# Patient Record
Sex: MALE | Race: BLACK OR AFRICAN AMERICAN | Employment: FULL TIME | URBAN - METROPOLITAN AREA
[De-identification: names, ages, dates, MRNs, and addresses within clinical notes are randomized per-mention and may not be internally consistent; named-entity substitution may affect disease eponyms.]

---

## 2017-11-06 ENCOUNTER — HOSPITAL ENCOUNTER (EMERGENCY)
Age: 27
Discharge: HOME | End: 2017-11-06
Attending: EMERGENCY MEDICINE | Admitting: EMERGENCY MEDICINE
Payer: SELF-PAY

## 2017-11-06 VITALS
OXYGEN SATURATION: 100 % | BODY MASS INDEX: 27.47 KG/M2 | SYSTOLIC BLOOD PRESSURE: 139 MMHG | DIASTOLIC BLOOD PRESSURE: 80 MMHG | HEIGHT: 67 IN | TEMPERATURE: 98.2 F | HEART RATE: 83 BPM | WEIGHT: 175.04 LBS | RESPIRATION RATE: 12 BRPM

## 2017-11-06 DIAGNOSIS — D57.1 HB-SS DISEASE WITHOUT CRISIS (HCC): Primary | ICD-10-CM

## 2017-11-06 LAB
ALBUMIN SERPL-MCNC: 4.1 G/DL (ref 3.5–5)
ALBUMIN/GLOB SERPL: 1 {RATIO} (ref 1.1–2.2)
ALP SERPL-CCNC: 81 U/L (ref 45–117)
ALT SERPL-CCNC: 37 U/L (ref 12–78)
ANION GAP SERPL CALC-SCNC: 4 MMOL/L (ref 5–15)
AST SERPL-CCNC: 25 U/L (ref 15–37)
BASOPHILS # BLD: 0 K/UL (ref 0–0.1)
BASOPHILS NFR BLD: 0 % (ref 0–1)
BILIRUB SERPL-MCNC: 0.7 MG/DL (ref 0.2–1)
BUN SERPL-MCNC: 10 MG/DL (ref 6–20)
BUN/CREAT SERPL: 10 (ref 12–20)
CALCIUM SERPL-MCNC: 8.8 MG/DL (ref 8.5–10.1)
CHLORIDE SERPL-SCNC: 104 MMOL/L (ref 97–108)
CO2 SERPL-SCNC: 31 MMOL/L (ref 21–32)
CREAT SERPL-MCNC: 0.97 MG/DL (ref 0.7–1.3)
EOSINOPHIL # BLD: 0 K/UL (ref 0–0.4)
EOSINOPHIL NFR BLD: 0 % (ref 0–7)
ERYTHROCYTE [DISTWIDTH] IN BLOOD BY AUTOMATED COUNT: 13 % (ref 11.5–14.5)
GLOBULIN SER CALC-MCNC: 4.3 G/DL (ref 2–4)
GLUCOSE SERPL-MCNC: 87 MG/DL (ref 65–100)
HCT VFR BLD AUTO: 43.5 % (ref 36.6–50.3)
HGB BLD-MCNC: 14.7 G/DL (ref 12.1–17)
LYMPHOCYTES # BLD: 1.6 K/UL (ref 0.8–3.5)
LYMPHOCYTES NFR BLD: 50 % (ref 12–49)
MCH RBC QN AUTO: 30.5 PG (ref 26–34)
MCHC RBC AUTO-ENTMCNC: 33.8 G/DL (ref 30–36.5)
MCV RBC AUTO: 90.2 FL (ref 80–99)
MONOCYTES # BLD: 0.4 K/UL (ref 0–1)
MONOCYTES NFR BLD: 13 % (ref 5–13)
NEUTS SEG # BLD: 1.2 K/UL (ref 1.8–8)
NEUTS SEG NFR BLD: 37 % (ref 32–75)
PLATELET # BLD AUTO: 245 K/UL (ref 150–400)
POTASSIUM SERPL-SCNC: 3.6 MMOL/L (ref 3.5–5.1)
PROT SERPL-MCNC: 8.4 G/DL (ref 6.4–8.2)
RBC # BLD AUTO: 4.82 M/UL (ref 4.1–5.7)
RETICS/RBC NFR AUTO: 0.9 % (ref 0.7–2.1)
SODIUM SERPL-SCNC: 139 MMOL/L (ref 136–145)
WBC # BLD AUTO: 3.3 K/UL (ref 4.1–11.1)

## 2017-11-06 PROCEDURE — 96374 THER/PROPH/DIAG INJ IV PUSH: CPT

## 2017-11-06 PROCEDURE — 96375 TX/PRO/DX INJ NEW DRUG ADDON: CPT

## 2017-11-06 PROCEDURE — 80053 COMPREHEN METABOLIC PANEL: CPT | Performed by: EMERGENCY MEDICINE

## 2017-11-06 PROCEDURE — 85025 COMPLETE CBC W/AUTO DIFF WBC: CPT | Performed by: EMERGENCY MEDICINE

## 2017-11-06 PROCEDURE — 36415 COLL VENOUS BLD VENIPUNCTURE: CPT | Performed by: EMERGENCY MEDICINE

## 2017-11-06 PROCEDURE — 99283 EMERGENCY DEPT VISIT LOW MDM: CPT

## 2017-11-06 PROCEDURE — 85045 AUTOMATED RETICULOCYTE COUNT: CPT | Performed by: EMERGENCY MEDICINE

## 2017-11-06 PROCEDURE — 74011250636 HC RX REV CODE- 250/636: Performed by: EMERGENCY MEDICINE

## 2017-11-06 PROCEDURE — 96361 HYDRATE IV INFUSION ADD-ON: CPT

## 2017-11-06 RX ORDER — OXYCODONE AND ACETAMINOPHEN 5; 325 MG/1; MG/1
1 TABLET ORAL
Qty: 20 TAB | Refills: 0 | Status: SHIPPED | OUTPATIENT
Start: 2017-11-06 | End: 2020-12-15

## 2017-11-06 RX ORDER — KETOROLAC TROMETHAMINE 30 MG/ML
30 INJECTION, SOLUTION INTRAMUSCULAR; INTRAVENOUS
Status: COMPLETED | OUTPATIENT
Start: 2017-11-06 | End: 2017-11-06

## 2017-11-06 RX ORDER — MORPHINE SULFATE 4 MG/ML
4 INJECTION INTRAVENOUS
Status: COMPLETED | OUTPATIENT
Start: 2017-11-06 | End: 2017-11-06

## 2017-11-06 RX ADMIN — KETOROLAC TROMETHAMINE 30 MG: 30 INJECTION, SOLUTION INTRAMUSCULAR at 14:11

## 2017-11-06 RX ADMIN — SODIUM CHLORIDE 1000 ML: 900 INJECTION, SOLUTION INTRAVENOUS at 13:54

## 2017-11-06 RX ADMIN — MORPHINE SULFATE 4 MG: 4 INJECTION INTRAVENOUS at 14:11

## 2017-11-06 NOTE — ED NOTES
Patient reports to ED with complaints of bone pain that began last night and that is worse in his lower extremities than it is in upper. Patient states he has a PMH of sickle cell and this is usually how his Sickle Cell Crisis begins. Patient took tylenol for the pain and obtained no relief. Patient placed on the monitor x2, call bell within reach. Side rails x2. Patient resting comfortably in stretcher. He reports having a ride home if he is medicated.

## 2017-11-06 NOTE — ED PROVIDER NOTES
[facility]  EMERGENCY DEPARTMENT HISTORY AND PHYSICAL EXAM       Date of Service: 11/6/2017   Patient Name: Ana María Humphrey   YOB: 1990  Medical Record Number: [id number]    History of Presenting Illness     Chief Complaint   Patient presents with    Sickle Cell Disease     Pt. complains of \"bone pain\"  since last night with hx. of same and dx with sickle cell        History Provided By:  patient    Additional History:   Ana María Humphrey is a 33 y.o. male  who presents ambulatory to the ED with cc of generalized bone pain greater in the lower extremities compare to the lower since last night. Pt notes pain is worse in the evenings and with change of temperature. He states symptoms are similar to past sickle cell symptoms. Pt notes taking tylenol without significant relief. He states his last sickle cell crisis was years ago. He specifically denies any fevers, chills, nausea, vomiting, chest pain, shortness of breath, headache, rash, diarrhea, sweating or weight loss. Social Hx: - Tobacco, - EtOH, - Illicit Drugs    There are no other complaints, changes or physical findings at this time. Primary Care Provider: None     Past History     Past Medical History:   Past Medical History:   Diagnosis Date    Sickle cell disease ([facility]         Past Surgical History:   History reviewed. No pertinent surgical history. Family History:   History reviewed. No pertinent family history. Social History:   Social History   Substance Use Topics    Smoking status: Never Smoker    Smokeless tobacco: Never Used    Alcohol use No        Allergies:   No Known Allergies      Review of Systems   Review of Systems   Constitutional: Negative. Negative for activity change, appetite change, chills, fatigue, fever and unexpected weight change. HENT: Negative. Negative for congestion, hearing loss, rhinorrhea, sneezing and voice change. Eyes: Negative.   Negative for pain and visual disturbance. Respiratory: Negative. Negative for apnea, cough, choking, chest tightness and shortness of breath. Cardiovascular: Negative. Negative for chest pain and palpitations. Gastrointestinal: Negative. Negative for abdominal distention, abdominal pain, blood in stool, diarrhea, nausea and vomiting. Genitourinary: Negative. Negative for difficulty urinating, flank pain, frequency and urgency. No discharge   Musculoskeletal: Negative for back pain and neck stiffness. Positive for bone pain (lower extremities > upper)   Skin: Negative. Negative for color change and rash. Neurological: Negative. Negative for dizziness, seizures, syncope, speech difficulty, weakness, numbness and headaches. Hematological: Negative for adenopathy. Psychiatric/Behavioral: Negative. Negative for agitation, behavioral problems, dysphoric mood and suicidal ideas. The patient is not nervous/anxious. Physical Exam  Physical Exam   Constitutional: He is oriented to person, place, and time. He appears well-developed and well-nourished. No distress. HENT:   Head: Normocephalic and atraumatic. Mouth/Throat: Oropharynx is clear and moist. No oropharyngeal exudate. Eyes: Conjunctivae and EOM are normal. Pupils are equal, round, and reactive to light. Right eye exhibits no discharge. Left eye exhibits no discharge. Neck: Normal range of motion. Neck supple. Cardiovascular: Normal rate, regular rhythm and intact distal pulses. Exam reveals no gallop and no friction rub. No murmur heard. Pulmonary/Chest: Effort normal and breath sounds normal. No respiratory distress. He has no wheezes. He has no rales. He exhibits no tenderness. Abdominal: Soft. Bowel sounds are normal. He exhibits no distension and no mass. There is no tenderness. There is no rebound and no guarding. Musculoskeletal: Normal range of motion. He exhibits no edema.    Lymphadenopathy:     He has no cervical adenopathy. Neurological: He is alert and oriented to person, place, and time. No cranial nerve deficit. Coordination normal.   Skin: Skin is warm and dry. No rash noted. No erythema. Psychiatric: He has a normal mood and affect. Nursing note and vitals reviewed. Medical Decision Making   I am the first provider for this patient. I reviewed the vital signs, available nursing notes, past medical history, past surgical history, family history and social history. Old Medical Records: Old medical records. Provider Notes:   DDx: sickle cell crisis, dehydration, electrolyte abnormality       ED Course:  1:51 PM   Initial assessment performed. The patients presenting problems have been discussed, and they are in agreement with the care plan formulated and outlined with them. I have encouraged them to ask questions as they arise throughout their visit. Diagnostic Study Results     Labs -      Recent Results (from the past 12 hour(s))   CBC WITH AUTOMATED DIFF    Collection Time: 11/06/17  1:56 PM   Result Value Ref Range    WBC 3.3 (L) 4.1 - 11.1 K/uL    RBC 4.82 4.10 - 5.70 M/uL    HGB 14.7 12.1 - 17.0 g/dL    HCT 43.5 36.6 - 50.3 %    MCV 90.2 80.0 - 99.0 FL    MCH 30.5 26.0 - 34.0 PG    MCHC 33.8 30.0 - 36.5 g/dL    RDW 13.0 11.5 - 14.5 %    PLATELET 904 941 - 617 K/uL    NEUTROPHILS 37 32 - 75 %    LYMPHOCYTES 50 (H) 12 - 49 %    MONOCYTES 13 5 - 13 %    EOSINOPHILS 0 0 - 7 %    BASOPHILS 0 0 - 1 %    ABS. NEUTROPHILS 1.2 (L) 1.8 - 8.0 K/UL    ABS. LYMPHOCYTES 1.6 0.8 - 3.5 K/UL    ABS. MONOCYTES 0.4 0.0 - 1.0 K/UL    ABS. EOSINOPHILS 0.0 0.0 - 0.4 K/UL    ABS.  BASOPHILS 0.0 0.0 - 0.1 K/UL   METABOLIC PANEL, COMPREHENSIVE    Collection Time: 11/06/17  1:56 PM   Result Value Ref Range    Sodium 139 136 - 145 mmol/L    Potassium 3.6 3.5 - 5.1 mmol/L    Chloride 104 97 - 108 mmol/L    CO2 31 21 - 32 mmol/L    Anion gap 4 (L) 5 - 15 mmol/L    Glucose 87 65 - 100 mg/dL    BUN 10 6 - 20 MG/DL Creatinine 0.97 0.70 - 1.30 MG/DL    BUN/Creatinine ratio 10 (L) 12 - 20      GFR est AA >60 >60 ml/min/1.73m2    GFR est non-AA >60 >60 ml/min/1.73m2    Calcium 8.8 8.5 - 10.1 MG/DL    Bilirubin, total 0.7 0.2 - 1.0 MG/DL    ALT (SGPT) 37 12 - 78 U/L    AST (SGOT) 25 15 - 37 U/L    Alk. phosphatase 81 45 - 117 U/L    Protein, total 8.4 (H) 6.4 - 8.2 g/dL    Albumin 4.1 3.5 - 5.0 g/dL    Globulin 4.3 (H) 2.0 - 4.0 g/dL    A-G Ratio 1.0 (L) 1.1 - 2.2     RETICULOCYTE COUNT    Collection Time: 11/06/17  1:56 PM   Result Value Ref Range    Reticulocyte count 0.9 0.7 - 2.1 %       Vital Signs-Reviewed the patient's vital signs. Patient Vitals for the past 12 hrs:   Temp Pulse Resp BP SpO2   11/06/17 1500 - - - 139/80 100 %   11/06/17 1430 - - - (!) 134/92 100 %   11/06/17 1400 - - - (!) 133/95 100 %   11/06/17 1339 98.2 °F (36.8 °C) 83 12 (!) 161/102 99 %       Medications Given in the ED:  Medications   sodium chloride 0.9 % bolus infusion 1,000 mL (0 mL IntraVENous IV Completed 11/6/17 1512)   ketorolac (TORADOL) injection 30 mg (30 mg IntraVENous Given 11/6/17 1411)   morphine 4 mg (4 mg IntraVENous Given 11/6/17 1411)         Diagnosis   Clinical Impression:   1. Hb-SS disease without crisis ([facility]         Plan:  1:   Follow-up Information     Follow up With Details Comments Contact Info    None   None ([phone]) Patient stated that they have no PCP          2:   Current Discharge Medication List      START taking these medications    Details   oxyCODONE-acetaminophen (PERCOCET) 5-325 mg per tablet Take 1 Tab by mouth every four (4) hours as needed for Pain. Max Daily Amount: 6 Tabs. Qty: 20 Tab, Refills: 0           Return to ED if Worse    Disposition Note:  DISCHARGE NOTE  3: 03 PM   The patient has been re-evaluated and is ready for discharge. Reviewed available results with patient. Counseled pt on diagnosis and care plan. Pt has expressed understanding, and all questions have been answered.  Pt agrees with plan and agrees to F/U as recommended, or return to the ED if their sxs worsen. Discharge instructions have been provided and explained to the pt, along with reasons to return to the ED.      _______________________________     Attestations: This note is prepared by Shayy Waddell, acting as Scribe for Denisa Adams. Masood Banks MD.      The scribe's documentation has been prepared under my direction and personally reviewed by me in its entirety. I confirm that the note above accurately reflects all work, treatment, procedures, and medical decision making performed by me. Hang Banks MD    _______________________________

## 2017-11-06 NOTE — ED NOTES
MD Markel Black reviewed discharge instructions with the patient. The patient verbalized understanding. Patient discharged home with vitals at baseline. Patient called for transportation and will be ambulatory to vehicle at its arrival. Patient has no further complaints at this time.

## 2017-11-06 NOTE — DISCHARGE INSTRUCTIONS
Sickle Cell Crisis: Care Instructions  Your Care Instructions    Sickle cell crisis is a painful episode that may begin suddenly in a person with sickle cell disease. Sickle cell disease turns normal, round red blood cells into cells that look like kwaku or crescent moons. The sickle-shaped cells can get stuck in blood vessels, blocking blood flow and causing severe pain. The pain can occur in the bones of the spine, the arms and legs, the chest, and the abdomen. An episode may be called a \"painful event\" or \"painful crisis. \" Some people who have sickle cell disease have many painful events, while others have few or none. Treatment depends on the level of pain and how long it lasts. Sometimes taking nonprescription pain relievers can help. Or you may need stronger pain relief medicine that is prescribed or given by a doctor. You may need to be treated in the hospital.  It isn't always possible to know what sets off a painful event. But triggers include being dehydrated, cold temperatures, infection, stress, and not getting enough oxygen. Follow-up care is a key part of your treatment and safety. Be sure to make and go to all appointments, and call your doctor if you are having problems. It's also a good idea to know your test results and keep a list of the medicines you take. How can you care for yourself at home? · Create a pain management plan with your doctor. This plan should include the types of medicines you can take and other actions you can take at home to relieve pain. · Drink plenty of fluids, enough so that your urine is light yellow or clear like water. If you have kidney, heart, or liver disease and have to limit fluids, talk with your doctor before you increase the amount of fluids you drink. · Take your medicines exactly as prescribed. Call your doctor if you think you are having a problem with your medicine. · Take pain medicines exactly as directed.   ¨ If the doctor gave you a prescription medicine for pain, take it as prescribed. ¨ If you are not taking a prescription pain medicine, ask your doctor if you can take an over-the-counter medicine. · Avoid alcohol. It can make you dehydrated. · Dress warmly in cold weather. The cold and windy weather can lead to severe pain. · Do not smoke. Smoking can reduce the amount of oxygen in your blood. · Get plenty of sleep. When should you call for help? Call 911 anytime you think you may need emergency care. For example, call if:  ? · You have symptoms of a severe problem from sickle cell. ? · You have symptoms of a stroke. These may include:  ¨ Sudden numbness, tingling, weakness, or loss of movement in your face, arm, or leg, especially on only one side of your body. ¨ Sudden vision changes. ¨ Sudden trouble speaking. ¨ Sudden confusion or trouble understanding simple statements. ¨ Sudden problems with walking or balance. ¨ A sudden, severe headache that is different from past headaches. ? · You are in severe pain. ? · You have symptoms of a heart attack. These may include:  ¨ Chest pain or pressure, or a strange feeling in the chest.  ¨ Sweating. ¨ Shortness of breath. ¨ Nausea or vomiting. ¨ Pain, pressure, or a strange feeling in the back, neck, jaw, or upper belly or in one or both shoulders or arms. ¨ Lightheadedness or sudden weakness. ¨ A fast or irregular heartbeat. After you call 911, the  may tell you to chew 1 adult-strength or 2 to 4 low-dose aspirin. Wait for an ambulance. Do not try to drive yourself. ?Call your doctor now or seek immediate medical care if:  ? · You have a fever. ? Watch closely for changes in your health, and be sure to contact your doctor if you have any problems. Where can you learn more? Go to http://heladio-toña.info/. Enter F104 in the search box to learn more about \"Sickle Cell Crisis: Care Instructions. \"  Current as of: October 13, 2016  Content Version: 11.4  © 8546-9885 [employer]. Care instructions adapted under license by [employer] (which disclaims liability or warranty for this information). If you have questions about a medical condition or this instruction, always ask your healthcare professional. [address] any warranty or liability for your use of this information.

## 2020-08-06 ENCOUNTER — OFFICE VISIT (OUTPATIENT)
Dept: NEUROLOGY | Facility: CLINIC | Age: 30
End: 2020-08-06
Payer: COMMERCIAL

## 2020-08-06 VITALS
RESPIRATION RATE: 18 BRPM | BODY MASS INDEX: 28.04 KG/M2 | SYSTOLIC BLOOD PRESSURE: 128 MMHG | DIASTOLIC BLOOD PRESSURE: 82 MMHG | OXYGEN SATURATION: 98 % | WEIGHT: 179 LBS | HEART RATE: 87 BPM

## 2020-08-06 DIAGNOSIS — G43.709 CHRONIC MIGRAINE W/O AURA W/O STATUS MIGRAINOSUS, NOT INTRACTABLE: Primary | ICD-10-CM

## 2020-08-06 DIAGNOSIS — G44.229 CHRONIC TENSION-TYPE HEADACHE, NOT INTRACTABLE: ICD-10-CM

## 2020-08-06 PROCEDURE — 99204 OFFICE O/P NEW MOD 45 MIN: CPT | Performed by: PSYCHIATRY & NEUROLOGY

## 2020-08-06 RX ORDER — BUTALBITAL, ACETAMINOPHEN AND CAFFEINE 300; 40; 50 MG/1; MG/1; MG/1
CAPSULE ORAL
COMMUNITY

## 2020-08-06 RX ORDER — SUMATRIPTAN 50 MG/1
50 TABLET, FILM COATED ORAL
Qty: 10 TAB | Refills: 1 | Status: SHIPPED | OUTPATIENT
Start: 2020-08-06 | End: 2020-08-06

## 2020-08-06 NOTE — PROGRESS NOTES
Chief Complaint   Patient presents with    Migraine         HISTORY OF PRESENT ILLNESS  Sandy Tsai is a 30 y.o. male who was referred from emergency department for evaluation of headaches. He says that he has had headaches for as long as he can remember. He used to have headaches which were mainly unilateral, throbbing pain, associated with light and sound sensitivity. He would get him a couple of times a month but in the last few months he has had them more frequently and they have lasted longer. He recently had an episode which was somewhat different and it was mainly a dull ache in the vertex and occipital area radiating down into the neck. It lingered on for 6 or 7 days and he went to the emergency department. He had a CT brain which was negative. He was given a combination of medications which relieved the pain and was advised to go see a neurologist.  Denies any changes in vision, problems with speaking, swallowing or focal motor or sensory deficits      Past Medical History:   Diagnosis Date    Sickle cell disease ([facility] [address]      Current Outpatient Medications   Medication Sig    butalbital-acetaminophen-caff (FIORICET) -40 mg per capsule Take  by mouth every four (4) hours as needed for Headache.  SUMAtriptan (IMITREX) 50 mg tablet Take 1 Tab by mouth once as needed for Migraine for up to 1 dose.  oxyCODONE-acetaminophen (PERCOCET) 5-325 mg per tablet Take 1 Tab by mouth every four (4) hours as needed for Pain. Max Daily Amount: 6 Tabs. No current facility-administered medications for this visit. No Known Allergies  No family history on file. Social History     Tobacco Use    Smoking status: Never Smoker    Smokeless tobacco: Never Used   Substance Use Topics    Alcohol use: No    Drug use: No     No past surgical history on file.       REVIEW OF SYSTEMS  Review of Systems - History obtained from the patient  Psychological ROS: negative  ENT ROS: negative  Hematological and Lymphatic ROS: negative  Endocrine ROS: negative  Respiratory ROS: no cough, shortness of breath, or wheezing  Cardiovascular ROS: no chest pain or dyspnea on exertion  Gastrointestinal ROS: no abdominal pain, change in bowel habits, or black or bloody stools  Genito-Urinary ROS: no dysuria, trouble voiding, or hematuria  Musculoskeletal ROS: negative  Dermatological ROS: negative      PHYSICAL EXAMINATION:    Visit Vitals  /82   Pulse 87   Resp 18   Wt 81.2 kg (179 lb)   SpO2 98%   BMI 28.04 kg/m²     General:  Well nourished and groomed individual in no acute distress. Neck: Supple, nontender, no bruits, no pain with resistance to active range of motion. Heart: Regular rate and rhythm. Normal S1S2. Lungs:  Equal chest expansion, no cough, no wheeze  Musculoskeletal:  Extremities revealed no edema and had full range of motion of joints. Psych:  Good mood and bright affect    NEUROLOGICAL EXAMINATION:     Mental Status:   Alert and oriented to person, place, and time with recent and remote memory intact. Attention span and concentration are normal. Speech is fluent. Cranial Nerves:    II, III, IV, VI:  Visual acuity grossly intact. Visual fields are normal.    Pupils are equal, round, and reactive to light and accommodation. Extra-ocular movements are full and fluid. Fundoscopic exam was benign, no ptosis or nystagmus. V-XII: Hearing is grossly intact. Facial features are symmetric, with normal sensation and strength. The palate rises symmetrically and the tongue protrudes midline. Sternocleidomastoids 5/5. Motor Examination: Normal tone, bulk, and strength. 5/5 muscle strength throughout. No cogwheel rigidity or clonus present. Sensory exam:  Normal throughout to pinprick, temperature, and vibration sense. Normal proprioception.       Coordination:  Finger to nose and rapid arm movement testing was normal.   No resting or intention tremor    Gait and Station:  Steady while walking on toes, heels, and with tandem walking. Normal arm swing. No Rhomberg or pronator drift. No muscle wasting or fasiculations noted. Reflexes:  DTRs 2+ throughout. Toes downgoing. LABS / IMAGING    CT brain was negative per patient    ASSESSMENT    ICD-10-CM ICD-9-CM    1. Chronic migraine w/o aura w/o status migrainosus, not intractable  G43.709 346.70 SUMAtriptan (IMITREX) 50 mg tablet   2. Chronic tension-type headache, not intractable  G44.229 339.12 SUMAtriptan (IMITREX) 50 mg tablet       DISCUSSION  Mr. Parisa Liz most likely has chronic migraines and more recently he has had tension type headaches  The treatment strategies for primary headache disorders were reviewed including potential dietary environmental triggers. He should try to keep a headache log so as to identify and avoid these. Try sumatriptan with or without an NSAID for abortive therapy  Nutritional supplements for headache prophylaxis were reviewed and he should try riboflavin and magnesium  Since his headache episodes are sporadic, deferring any pharmacologic prophylactic for now  Follow-up in 3 months    Daniela Linda MD  Diplomate, American Board of Psychiatry & Neurology (Neurology)  Diplomate, American Board of Psychiatry & Neurology (Clinical Neurophysiology)  Diplomate, American Board of Electrodiagnostic Medicine  This note will not be viewable in [address]

## 2020-09-21 ENCOUNTER — VIRTUAL VISIT (OUTPATIENT)
Dept: INTERNAL MEDICINE CLINIC | Age: 30
End: 2020-09-21
Payer: COMMERCIAL

## 2020-09-21 DIAGNOSIS — Z79.899 MEDICATION MANAGEMENT: ICD-10-CM

## 2020-09-21 DIAGNOSIS — D57.1 HB-SS DISEASE WITHOUT CRISIS (HCC): ICD-10-CM

## 2020-09-21 DIAGNOSIS — Z82.3 FAMILY HISTORY OF CVA: ICD-10-CM

## 2020-09-21 DIAGNOSIS — G43.909 MIGRAINE WITHOUT STATUS MIGRAINOSUS, NOT INTRACTABLE, UNSPECIFIED MIGRAINE TYPE: ICD-10-CM

## 2020-09-21 DIAGNOSIS — Z83.438 FAMILY HISTORY OF HYPERLIPIDEMIA: ICD-10-CM

## 2020-09-21 DIAGNOSIS — Z76.89 ENCOUNTER TO ESTABLISH CARE: Primary | ICD-10-CM

## 2020-09-21 PROCEDURE — 99203 OFFICE O/P NEW LOW 30 MIN: CPT | Performed by: FAMILY MEDICINE

## 2020-09-21 RX ORDER — ACETAMINOPHEN 325 MG/1
325 TABLET ORAL
COMMUNITY

## 2020-09-21 NOTE — PROGRESS NOTES
Angel Rodrate is a 35 y.o. male who was seen by synchronous (real-time) audio-video technology on 9/21/2020 for Establish Care (NP est care. Discuss family planning. )    Pt is here to establish care. He denies allergies and has no surgical hx. 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 He has not seen a hematologist in many years. His mother has hyperlipidemia and hypertension. She recently had a stroke and is recovering slowly. She has a clotting disorder and has experienced a PE. His father also had hypertension and a stroke. He takes Fioricet and Percocet PRN for migraine management. He only uses Imitrex for abortive treatment if Fioricet is unsuccessful. He is followed by Dr. Abbe Carmichael (neurology) and is scheduled to see him in 11/2020. He has not smoked tobacco in some time, but does use a vape pen with non-nicotine products for HA management on occasion. PREVENTIVE:  Flu: Discussed. Pt will consider. Assessment & Plan:   Diagnoses and all orders for this visit:    1. Encounter to establish care    2. Hb-SS disease without crisis ([facility] [address]  -     CBC WITH AUTOMATED DIFF  -     REFERRAL TO HEMATOLOGY    3. Migraine without status migrainosus, not intractable, unspecified migraine type  -     CBC WITH AUTOMATED DIFF    4. Medication management  -     METABOLIC PANEL, BASIC    5. Family history of CVA  -     METABOLIC PANEL, BASIC    6. Family history of hyperlipidemia  -     LIPID PANEL      1. Encounter to Establish Care  I will check routine labs and refer to hematology for care establishment. 2. Hb-SS disease without crisis  Check CBC. I provided a referral to Dr. Teresa Sifuentes (hematology) for evaluation. 3. Migraine without status migrainosus  Pt is stable and well-managed with Fioricet and Imitrex. He is followed by Dr. Abbe Carmichael (neurology). 4. Medication Management  Check BMP for kidney function.     5. Family History of CVA  Check BMP for kidney function. 6. Family History of Hyperlipidemia  Check lipid panel. Subjective:       Prior to Admission medications    Medication Sig Start Date End Date Taking? Authorizing Provider   acetaminophen (TylenoL) 325 mg tablet Take 325 mg by mouth every four (4) hours as needed for Pain. Yes Provider, Historical   butalbital-acetaminophen-caff (FIORICET) -40 mg per capsule Take  by mouth every four (4) hours as needed for Headache. Yes Provider, Historical   oxyCODONE-acetaminophen (PERCOCET) 5-325 mg per tablet Take 1 Tab by mouth every four (4) hours as needed for Pain. Max Daily Amount: 6 Tabs. 11/6/17  Yes Rhonda Munoz MD     There are no active problems to display for this patient. Current Outpatient Medications   Medication Sig Dispense Refill    acetaminophen (TylenoL) 325 mg tablet Take 325 mg by mouth every four (4) hours as needed for Pain.  butalbital-acetaminophen-caff (FIORICET) -40 mg per capsule Take  by mouth every four (4) hours as needed for Headache.  oxyCODONE-acetaminophen (PERCOCET) 5-325 mg per tablet Take 1 Tab by mouth every four (4) hours as needed for Pain. Max Daily Amount: 6 Tabs. 20 Tab 0     Past Medical History:   Diagnosis Date    Sickle cell disease ([address]      History reviewed. No pertinent surgical history. Family History   Problem Relation Age of Onset    Stroke Mother     Elevated Lipids Mother     Stroke Father      Social History     Tobacco Use    Smoking status: Former Smoker     Packs/day: 0.25     Years: 1.00     Pack years: 0.25    Smokeless tobacco: Never Used    Tobacco comment: vape use- THC & CBD   Substance Use Topics    Alcohol use: Yes     Comment: on occasion       Review of Systems   Respiratory: Negative for shortness of breath. Cardiovascular: Negative for chest pain. Objective:   No flowsheet data found.      [INSTRUCTIONS:  \"[x]\" Indicates a positive item  \"[]\" Indicates a negative item  -- DELETE ALL ITEMS NOT EXAMINED]    Constitutional: [x] Appears well-developed and well-nourished [x] No apparent distress      [] Abnormal -     Mental status: [x] Alert and awake  [x] Oriented to person/place/time [x] Able to follow commands    [] Abnormal -     Eyes:   EOM    [x]  Normal    [] Abnormal -   Sclera  [x]  Normal    [] Abnormal -          Discharge [x]  None visible   [] Abnormal -     HENT: [x] Normocephalic, atraumatic  [] Abnormal -   [x] Mouth/Throat: Mucous membranes are moist    External Ears [x] Normal  [] Abnormal -    Neck: [x] No visualized mass [] Abnormal -     Pulmonary/Chest: [x] Respiratory effort normal   [x] No visualized signs of difficulty breathing or respiratory distress        [] Abnormal -      Musculoskeletal:   [x] Normal gait with no signs of ataxia         [x] Normal range of motion of neck        [] Abnormal -     Neurological:        [x] No Facial Asymmetry (Cranial nerve 7 motor function) (limited exam due to video visit)          [x] No gaze palsy        [] Abnormal -          Skin:        [x] No significant exanthematous lesions or discoloration noted on facial skin         [] Abnormal -            Psychiatric:       [x] Normal Affect [] Abnormal -        [x] No Hallucinations    Other pertinent observable physical exam findings:-        We discussed the expected course, resolution and complications of the diagnosis(es) in detail. Medication risks, benefits, costs, interactions, and alternatives were discussed as indicated. I advised him to contact the office if his condition worsens, changes or fails to improve as anticipated. He expressed understanding with the diagnosis(es) and plan. Andrew Lyn, who was evaluated through a patient-initiated, synchronous (real-time) audio-video encounter, and/or his healthcare decision maker, is aware that it is a billable service, with coverage as determined by his insurance carrier.  He provided verbal consent to proceed: Yes, and patient identification was verified. It was conducted pursuant to the emergency declaration under the [address] [place] [address] and the [email or web address] and [employer] Act. A caregiver was present when appropriate. Ability to conduct physical exam was limited. I was in the office. The patient was at home.       [facility]

## 2020-10-01 ENCOUNTER — TELEPHONE (OUTPATIENT)
Dept: INTERNAL MEDICINE CLINIC | Age: 30
End: 2020-10-01

## 2020-10-01 NOTE — TELEPHONE ENCOUNTER
I returned call to pt, 2 pt identifiers verified. Pt states that the hematologist that Dr Chica Abdi referred him to retired & the other doctors in the practice to not specialize in sickle cell. Pt wants to know who else Dr Chica Abdi would recommend. Lab slip will be mailed out to pt along with a list of lab corps in the surrounding area.

## 2020-10-01 NOTE — TELEPHONE ENCOUNTER
Lab orders mailed to patient. Patient will contact Grisel to find out what hematologist is in network with his insurance.

## 2020-10-01 NOTE — TELEPHONE ENCOUNTER
Patient states he needs a call back to discuss Hematologist he was referred to is retired & others in [employer] do not work with his Insurance. Patient states he needs the name of someone else Please. Patient also states he needs to discuss getting his Labs/Blood Work done & orders needed. Please call.  Thank you

## 2020-12-15 ENCOUNTER — OFFICE VISIT (OUTPATIENT)
Dept: URGENT CARE | Age: 30
End: 2020-12-15
Payer: COMMERCIAL

## 2020-12-15 VITALS — RESPIRATION RATE: 14 BRPM | TEMPERATURE: 98.9 F | HEART RATE: 69 BPM | OXYGEN SATURATION: 99 %

## 2020-12-15 DIAGNOSIS — Z11.59 SCREENING FOR VIRAL DISEASE: Primary | ICD-10-CM

## 2020-12-15 PROCEDURE — 99203 OFFICE O/P NEW LOW 30 MIN: CPT | Performed by: FAMILY MEDICINE

## 2020-12-15 NOTE — PROGRESS NOTES
This patient was seen at [address] [place] [address] [facility]gent Care while in their vehicle due to COVID-19 pandemic with PPE and focused examination in order to decrease community viral transmission. The patient/guardian gave verbal consent to treat. Victor M Rodriges is a 35 y.o. male who presents for COVID-19 testing. No known COVID-19 contacts, but wife with COVID-19 like sx, being tested today. Denies cough, fever, SOB. The history is provided by the patient. Past Medical History:   Diagnosis Date    Sickle cell disease ([facility] [address]         History reviewed. No pertinent surgical history.       Family History   Problem Relation Age of Onset    Stroke Mother     Elevated Lipids Mother     Stroke Father         Social History     Socioeconomic History    Marital status: SINGLE     Spouse name: Not on file    Number of children: Not on file    Years of education: Not on file    Highest education level: Not on file   Occupational History    Not on file   Social Needs    Financial resource strain: Not on file    Food insecurity     Worry: Not on file     Inability: Not on file    Transportation needs     Medical: Not on file     Non-medical: Not on file   Tobacco Use    Smoking status: Former Smoker     Packs/day: 0.25     Years: 1.00     Pack years: 0.25    Smokeless tobacco: Never Used    Tobacco comment: vape use- THC & CBD   Substance and Sexual Activity    Alcohol use: Yes     Comment: on occasion    Drug use: No    Sexual activity: Yes     Partners: Female     Birth control/protection: None   Lifestyle    Physical activity     Days per week: Not on file     Minutes per session: Not on file    Stress: Not on file   Relationships    Social connections     Talks on phone: Not on file     Gets together: Not on file     Attends Taoism service: Not on file     Active member of club or organization: Not on file     Attends meetings of clubs or organizations: Not on file     Relationship status: Not on file    Intimate partner violence     Fear of current or ex partner: Not on file     Emotionally abused: Not on file     Physically abused: Not on file     Forced sexual activity: Not on file   Other Topics Concern    Not on file   Social History Narrative    Not on file                ALLERGIES: Patient has no known allergies. Review of Systems   Constitutional: Negative for activity change, appetite change, chills and fever. HENT: Negative for congestion, rhinorrhea and sore throat. Respiratory: Negative for cough, shortness of breath and wheezing. Cardiovascular: Negative for chest pain. Gastrointestinal: Negative for abdominal pain, diarrhea, nausea and vomiting. Musculoskeletal: Negative for myalgias. Neurological: Negative for headaches. Vitals:    12/15/20 1240   Pulse: 69   Resp: 14   Temp: 98.9 °F (37.2 °C)   SpO2: 99%       Physical Exam  Vitals signs and nursing note reviewed. Constitutional:       General: He is not in acute distress. Appearance: He is well-developed. He is not diaphoretic. Pulmonary:      Effort: Pulmonary effort is normal. No respiratory distress. Breath sounds: Normal breath sounds. No stridor. No wheezing, rhonchi or rales. Neurological:      Mental Status: He is alert. Psychiatric:         Behavior: Behavior normal.         Thought Content:  Thought content normal.         Judgment: Judgment normal.         MDM    ICD-10-CM ICD-9-CM   1. Screening for viral disease  Z11.59 V73.99       Orders Placed This Encounter    NOVEL CORONAVIRUS (COVID-19)     Scheduling Instructions:      1) Due to current limited availability of the COVID-19 PCR test, tests will be prioritized and may not be completed.              2) Order only if the test result will change clinical management or necessary for a return to mission-critical employment decision.              3) Print and instruct patient to adhere to [facility] home isolation program. ([address] 4) Set up or refer patient for a monitoring program.              5) Have patient sign up for and leverage MyChart (if not previously done). Order Specific Question:   Is this test for diagnosis or screening? Answer:   Screening     Order Specific Question:   Symptomatic for COVID-19 as defined by CDC? Answer:   No     Order Specific Question:   Hospitalized for COVID-19? Answer:   No     Order Specific Question:   Admitted to ICU for COVID-19? Answer:   No     Order Specific Question:   Employed in healthcare setting? Answer:   No     Order Specific Question:   Resident in a congregate (group) care setting? Answer:   No     Order Specific Question:   Previously tested for COVID-19? Answer:   No        Quarantine  Deep breathing exercises, ambulation      If signs and symptoms become worse the pt is to go to the ER.          Procedures

## 2020-12-17 LAB — SARS-COV-2, NAA: NOT DETECTED
